# Patient Record
(demographics unavailable — no encounter records)

---

## 2024-10-16 NOTE — HISTORY OF PRESENT ILLNESS
[Mother] : mother [No] : No cigarette smoke exposure [NO] : No [de-identified] : 6 m[address] [facility] [FreeTextEntry1] : POPPY  is here for 6  month  well child visit Safety: Water heater temperature set at <120 degrees F. Carbon monoxide detectors at home. Smoke detectors at home. No gun in home. Nutrition taking vitamin d ebm formula solids purees once a day  Elimination: Normal urination and bowel movements Sleep: No concerns Immunizations: Up to date. Environmental   safety discussed Patient is doing well at home. Parent(s) have current concerns or issues. doing well rolling both ways sitting doing well diaper rash evaluated by Dr. Horta cardiologist PFO resolved and mild PPS history, no routine cardiac follow up family history maternal uncle VSD

## 2024-10-16 NOTE — DEVELOPMENTAL MILESTONES
[Passed] : passed [FreeTextEntry1] : [place]:  Gross Motor  6-3     Fine Motor5-2     Psychosocial  5-3      Language 6-2

## 2024-10-16 NOTE — PHYSICAL EXAM
[TextEntry] : General Appearance:  Awake,  Alert  In no acute distress well appearing Head:  Appearance:  Anterior fontanelle open and flat Neck:  supple. Eyes:   Pupils:  PERRL Ears: bilateral  Tympanic Membrane:  Pearly with light reflex bilaterally. Pharynx:Normal findings  non erythematous pharynx Lungs:  Clear to auscultation. Cardiovascular: Heart Rate And Rhythm:  Regular. Heart Sounds:  Normal. Murmurs:  No murmurs were heard. Abdomen: Palpation:  Soft.  Liver:  Not enlarged. Spleen:  Not enlarged.  Genitalia: Normal female external genitalia Vern 1.  Musculoskeletal System: General/bilateral:  Normal movement of all extremities.   Normal spine and back  Normal spine and back. Hips: General/bilateral:  An Ortolani test of the hips was negative.   Medina's test of the hips was negative Neurological:Motor:  Normal muscle tone. diaper rash 2 areas more elevated irritated

## 2024-10-16 NOTE — HISTORY OF PRESENT ILLNESS
[Mother] : mother [No] : No cigarette smoke exposure [NO] : No [de-identified] : 6 mth [facility] [FreeTextEntry1] : POPPY  is here for 6  month  well child visit Safety: Water heater temperature set at <120 degrees F. Carbon monoxide detectors at home. Smoke detectors at home. No gun in home. Nutrition taking vitamin d ebm formula solids purees once a day  Elimination: Normal urination and bowel movements Sleep: No concerns Immunizations: Up to date. Environmental   safety discussed Patient is doing well at home. Parent(s) have current concerns or issues. doing well rolling both ways sitting doing well diaper rash evaluated by Dr. Horta cardiologist PFO resolved and mild PPS history, no routine cardiac follow up family history maternal uncle VSD

## 2024-10-16 NOTE — HISTORY OF PRESENT ILLNESS
[Mother] : mother [No] : No cigarette smoke exposure [NO] : No [de-identified] : 6 mth [facility] [FreeTextEntry1] : POPPY  is here for 6  month  well child visit Safety: Water heater temperature set at <120 degrees F. Carbon monoxide detectors at home. Smoke detectors at home. No gun in home. Nutrition taking vitamin d ebm formula solids purees once a day  Elimination: Normal urination and bowel movements Sleep: No concerns Immunizations: Up to date. Environmental   safety discussed Patient is doing well at home. Parent(s) have current concerns or issues. doing well rolling both ways sitting doing well diaper rash evaluated by Dr. Horta cardiologist PFO resolved and mild PPS history, no routine cardiac follow up family history maternal uncle VSD

## 2024-10-16 NOTE — PLAN
[TextEntry] : flu and beyfortus discussed solids discussed mupirocin ointment tid stop vitamin d given mvf   The following 6 month anticipatory guidance topics were discussed and/or handouts given:  nutrition and feeding, infant development, oral health and safety. Counseling for nutrition was provided.  Information discussed with parent/guardian.    The components of the vaccine(s) to be administered today are listed in the plan of care. The disease(s) for which the vaccine(s) are intended to prevent and the risks have been discussed with the caretaker. The risks are also included in the appropriate vaccination information statements which have been provided to the patient's caregiver. The caregiver has given consent to vaccinate.

## 2025-01-22 NOTE — HISTORY OF PRESENT ILLNESS
[Mother] : mother [FreeTextEntry7] : 9 mth [facility] [FreeTextEntry1] : POPPY  is here for 9  month  well child visit Safety: Water heater temperature set at <120 degrees F. Carbon monoxide detectors at home. Smoke detectors at home. No gun in home. Nutrition formula about 28 oz 2 meals table food cruising pull to stand cawl bablin Elimination: Normal urination and bowel movements Sleep: No concerns Immunizations: Up to date. Environmental   safety discussed Patient is doing well at home. Parent(s) have current concerns or issues. doing well cruising pull to stand crawl babbling needs second influenza vaccine evaluated by Dr. Horta cardiologist PFO resolved and mild PPS history, no routine cardiac follow up family history maternal uncle VSD

## 2025-01-22 NOTE — PLAN
[TextEntry] : Counseling for all components of the vaccines given today (see orders below) discussed with patient and patients parent/legal guardian.  All questions answered good weight gain   The following 6 month anticipatory guidance topics were discussed and/or handouts given:  nutrition and feeding, infant development, oral health and safety. Counseling for nutrition was provided.  Information discussed with parent/guardian.    The components of the vaccine(s) to be administered today are listed in the plan of care. The disease(s) for which the vaccine(s) are intended to prevent and the risks have been discussed with the caretaker. The risks are also included in the appropriate vaccination information statements which have been provided to the patient's caregiver. The caregiver has given consent to vaccinate.

## 2025-01-22 NOTE — HISTORY OF PRESENT ILLNESS
[Mother] : mother [FreeTextEntry7] : 9 mth [facility] [FreeTextEntry1] : POPPY  is here for 9  month  well child visit Safety: Water heater temperature set at <120 degrees F. Carbon monoxide detectors at home. Smoke detectors at home. No gun in home. Nutrition formula about 28 oz 2 meals table food cruising pull to stand cawl bablin Elimination: Normal urination and bowel movements Sleep: No concerns Immunizations: Up to date. Environmental   safety discussed Patient is doing well at home. Parent(s) have current concerns or issues. doing well cruising pull to stand crawl babbling needs second influenza vaccine evaluated by Dr. Horta cardiologist PFO resolved and mild PPS history, no routine cardiac follow up family history maternal uncle VSD

## 2025-01-22 NOTE — HISTORY OF PRESENT ILLNESS
[Mother] : mother [FreeTextEntry7] : 9 mth [facility] [FreeTextEntry1] : POPPY  is here for 9  month  well child visit Safety: Water heater temperature set at <120 degrees F. Carbon monoxide detectors at home. Smoke detectors at home. No gun in home. Nutrition formula about 28 oz 2 meals table food cruising pull to stand cawl bablin Elimination: Normal urination and bowel movements Sleep: No concerns Immunizations: Up to date. Environmental   safety discussed Patient is doing well at home. Parent(s) have current concerns or issues. doing well cruising pull to stand crawl babbling needs second influenza vaccine evaluated by Dr. Horta cardiologist PFO resolved and mild PPS history, no routine cardiac follow up family history maternal uncle VSD

## 2025-01-22 NOTE — PHYSICAL EXAM
[TextEntry] :  General Appearance:  Awake,  Alert  In no acute distress good eye contact unoopoerative re hieght check x2 Head:  Appearance:  Anterior fontanelle open and flat Neck:  supple. Eyes:   Pupils:  PERRL Ears: bilateral  Tympanic Membrane:  Pearly with light reflex bilaterally. Pharynx:Normal findings  non erythematous pharynx Lungs:  Clear to auscultation. Cardiovascular: Heart Rate And Rhythm:  Regular. Heart Sounds:  Normal. Murmurs:  No murmurs were heard. Abdomen: Palpation:  Soft.  Liver:  Not enlarged. Spleen:  Not enlarged.  Genitalia: Normal female external genitalia Vern 1.  Musculoskeletal System: General/bilateral:  Normal movement of all extremities.   Normal spine and back  Normal spine and back. Hips: General/bilateral:  An Ortolani test of the hips was negative.   Medina's test of the hips was negative Neurological:Motor:  Normal muscle tone.

## 2025-01-22 NOTE — PHYSICAL EXAM
Body Location Override (Optional - Billing Will Still Be Based On Selected Body Map Location If Applicable): left medial lower eyelid (Left superior medial malar cheek) [TextEntry] :  General Appearance:  Awake,  Alert  In no acute distress good eye contact unoopoerative re hieght check x2 Head:  Appearance:  Anterior fontanelle open and flat Neck:  supple. Eyes:   Pupils:  PERRL Ears: bilateral  Tympanic Membrane:  Pearly with light reflex bilaterally. Pharynx:Normal findings  non erythematous pharynx Lungs:  Clear to auscultation. Cardiovascular: Heart Rate And Rhythm:  Regular. Heart Sounds:  Normal. Murmurs:  No murmurs were heard. Abdomen: Palpation:  Soft.  Liver:  Not enlarged. Spleen:  Not enlarged.  Genitalia: Normal female external genitalia Vern 1.  Musculoskeletal System: General/bilateral:  Normal movement of all extremities.   Normal spine and back  Normal spine and back. Hips: General/bilateral:  An Ortolani test of the hips was negative.   Medina's test of the hips was negative Neurological:Motor:  Normal muscle tone.

## 2025-02-26 NOTE — REVIEW OF SYSTEMS
[Ear Tugging] : no ear tugging [Nasal Congestion] : nasal congestion [Wheezing] : no wheezing [Cough] : cough [Rash] : rash [Negative] : Musculoskeletal

## 2025-02-26 NOTE — DISCUSSION/SUMMARY
[FreeTextEntry1] : Aquaphor, Desitin if increased redness  Recommend supportive care including humidifier, fluids, and nasal saline followed by nasal suction. Return if symptoms worsen or persist.

## 2025-02-26 NOTE — HISTORY OF PRESENT ILLNESS
[de-identified] : diaper rash X 1.5 weeks mom applied desitin, vaseline and aquaphor with ni improvements, also has some nasal congestion few days, afebrile, alert and active, no N/V/D/C, eating well, having wet diapers, not irritable  [FreeTextEntry6] : Persistent diaper rash x 10 days.  Bothers her at times, gets very red then improves with Aquaphor.  No diarrhea or fevers.  Also has some mild cold sxs.

## 2025-03-20 NOTE — HISTORY OF PRESENT ILLNESS
[de-identified] : Pulling left ear x 1 day as per dad. No other c/o.  [FreeTextEntry6] : Cold symptoms for about a week, slowly resolving. Now reaching behind left ear since yesterday. She is afebrile.

## 2025-03-20 NOTE — HISTORY OF PRESENT ILLNESS
[de-identified] : Pulling left ear x 1 day as per dad. No other c/o.  [FreeTextEntry6] : Cold symptoms for about a week, slowly resolving. Now reaching behind left ear since yesterday. She is afebrile.

## 2025-04-15 NOTE — BEGINNING OF VISIT
I discussed the patient with the resident.  I agree with history, physical, assessment and plan as documented.    [Parents] : parents

## 2025-04-16 NOTE — PLAN
[TextEntry] : .  The following 12 month anticipatory guidance topics were discussed and/or handouts given: establishing routines, feeding and appetite changes, oral hygiene and safety. Counseling for nutrition was provided.   Information discussed with parent/guardian.    The components of the vaccine(s) to be administered today are listed in the plan of care. The disease(s) for which the vaccine(s) are intended to prevent and the risks have been discussed with the caretaker. The risks are also included in the appropriate vaccination information statements which have been provided to the patient's caregiver. The caregiver has given consent to vaccinate.

## 2025-04-16 NOTE — HISTORY OF PRESENT ILLNESS
[Parents] : parents [FreeTextEntry7] : 12 mth [facility] [FreeTextEntry1] : POPPY  is here for12  month  well child visit Safety: Water heater temperature set at <120 degrees F. Carbon monoxide detectors at home. Smoke detectors at home. No gun in home. Nutrition formula  inc whole milk, wean re formula less than 24 oz, good eater Elimination: Normal urination and bowel movements Sleep: No concerns Immunizations: Up to date. Environmental   safety discussed Patient is doing well at home. Parent(s) have current concerns or issues. doing well, starting to walk few steps, babbling starting to point, taking swimming lessons recent uri improved introduced 2 oz milk, will increase gradually tolerated well uses sippy cup von transition to bottle evaluated by Dr. Horta cardiologist PFO resolved and mild PPS history, no routine cardiac follow up family history maternal uncle VSD

## 2025-04-16 NOTE — DEVELOPMENTAL MILESTONES
[FreeTextEntry1] : [place]:  Gross Motor  13-3     Fine Motor   13-3  Psychosocial   12-3     Language 13-1

## 2025-04-16 NOTE — HISTORY OF PRESENT ILLNESS
[Parents] : parents [FreeTextEntry7] : 12 mth [facility] [FreeTextEntry1] : POPPY  is here for12  month  well child visit Safety: Water heater temperature set at <120 degrees F. Carbon monoxide detectors at home. Smoke detectors at home. No gun in home. Nutrition formula  inc whole milk, wean re formula less than 24 oz, good eater Elimination: Normal urination and bowel movements Sleep: No concerns Immunizations: Up to date. Environmental   safety discussed Patient is doing well at home. Parent(s) have current concerns or issues. doing well, starting to walk few steps, babbling starting to point, taking swimming lessons recent uri improved introduced 2 oz milk, will increase gradually tolerated well uses sippy cup von transition to bottle evaluated by Dr. Horta cardiologist PFO resolved and mild PPS history, no routine cardiac follow up family history maternal uncle VSD

## 2025-04-16 NOTE — PHYSICAL EXAM
[TextEntry] : General Appearance:  Awake,  Alert  In no acute distress good eye contact well appearing afof small normal for age Neck:  supple. Eyes:   Pupils:  PERRL Ears: bilateral  Tympanic Membrane:  Pearly with light reflex bilaterally. Pharynx:Normal findings  non erythematous pharynx Lungs:  Clear to auscultation. Cardiovascular: Heart Rate And Rhythm:  Regular. Heart Sounds:  Normal. Murmurs:  No murmurs were heard. Abdomen: Palpation:  Soft.  Liver:  Not enlarged. Spleen:  Not enlarged.  Genitalia: Vern 1  normal female external genitalia   Musculoskeletal System: General/bilateral:  Normal movement of all extremities.   Normal spine and back. Hips: General/bilateral:  An Ortolani test of the hips was negative.   Medina's test of the hips was negative Neurological:Motor:  Normal muscle tone.

## 2025-04-16 NOTE — HISTORY OF PRESENT ILLNESS
[Parents] : parents [FreeTextEntry7] : 12 mth [facility] [FreeTextEntry1] : POPPY  is here for12  month  well child visit Safety: Water heater temperature set at <120 degrees F. Carbon monoxide detectors at home. Smoke detectors at home. No gun in home. Nutrition formula  inc whole milk, wean re formula less than 24 oz, good eater Elimination: Normal urination and bowel movements Sleep: No concerns Immunizations: Up to date. Environmental   safety discussed Patient is doing well at home. Parent(s) have current concerns or issues. doing well, starting to walk few steps, babbling starting to point, taking swimming lessons recent uri improved introduced 2 oz milk, will increase gradually tolerated well uses sippy cup von transition to bottle evaluated by Dr. Horta cardiologist PFO resolved and mild PPS history, no routine cardiac follow up family history maternal uncle VSD

## 2025-07-16 NOTE — PHYSICAL EXAM
[TextEntry] : General Appearance:  Awake,  Alert  In no acute distress well appearing Neck:  supple. Eyes:   Pupils:  PERRL Ears: bilateral  Tympanic Membrane:  Pearly with light reflex bilaterally. Pharynx:Normal findings  non erythematous pharynx Lungs:  Clear to auscultation. Cardiovascular: Heart Rate And Rhythm:  Regular. Heart Sounds:  Normal. Murmurs:  No murmurs were heard. Abdomen: Palpation:  Soft.  Liver:  Not enlarged. Spleen:  Not enlarged.  Genitalia: Vern 1  normal female external genitalia   Musculoskeletal System: General/bilateral:  Normal movement of all extremities.   Normal spine and back. Hips: General/bilateral:  An Ortolani test of the hips was negative.   Medina's test of the hips was negative Neurological:Motor:  Normal muscle tone.

## 2025-07-16 NOTE — DEVELOPMENTAL MILESTONES
[FreeTextEntry1] : [place]:  Gross Motor   14-3    Fine Motor  20-2   Psychosocial      19-3  [id number]

## 2025-07-16 NOTE — HISTORY OF PRESENT ILLNESS
[Parents] : parents [FreeTextEntry7] : 15 mo [facility] [FreeTextEntry1] : POPPY  is here for 15  month  well child visit Safety: Water heater temperature set at <120 degrees F. Carbon monoxide detectors at home. Smoke detectors at home. No gun in home. Nutrition good veggies fruits Elimination: Normal urination and bowel movements mushy past few weeks not uncomfortable  Sleep: No concerns Immunizations: Up to date. Environmental   safety discussed Patient is doing well at home. Parent(s) have current concerns or issues. doing well, evaluated by Dr. Horta cardiologist PFO resolved and mild PPS history, no routine cardiac follow up family history maternal uncle VSD  stools mushy past few weeks stopped dairy one week  no change increase fruits with summer, no juice,  eats veggies/fruits observe no family history celiac  teething says about 5 to 6 words understands points doing well

## 2025-07-16 NOTE — HISTORY OF PRESENT ILLNESS
[Parents] : parents [FreeTextEntry7] : 15 mo [facility] [FreeTextEntry1] : POPPY  is here for 15  month  well child visit Safety: Water heater temperature set at <120 degrees F. Carbon monoxide detectors at home. Smoke detectors at home. No gun in home. Nutrition good veggies fruits Elimination: Normal urination and bowel movements mushy past few weeks not uncomfortable  Sleep: No concerns Immunizations: Up to date. Environmental   safety discussed Patient is doing well at home. Parent(s) have current concerns or issues. doing well, evaluated by Dr. Horta cardiologist PFO resolved and mild PPS history, no routine cardiac follow up family history maternal uncle VSD  stools mushy past few weeks stopped dairy one week  no change increase fruits with summer, no juice,  eats veggies/fruits observe no family history celiac  teething says about 5 to 6 words understands points doing well

## 2025-07-16 NOTE — HISTORY OF PRESENT ILLNESS
[Parents] : parents [FreeTextEntry7] : 15 mo [facility] [FreeTextEntry1] : POPPY  is here for 15  month  well child visit Safety: Water heater temperature set at <120 degrees F. Carbon monoxide detectors at home. Smoke detectors at home. No gun in home. Nutrition good veggies fruits Elimination: Normal urination and bowel movements mushy past few weeks not uncomfortable  Sleep: No concerns Immunizations: Up to date. Environmental   safety discussed Patient is doing well at home. Parent(s) have current concerns or issues. doing well, evaluated by Dr. Horta cardiologist PFO resolved and mild PPS history, no routine cardiac follow up family history maternal uncle VSD  stools mushy past few weeks stopped dairy one week  no change increase fruits with summer, no juice,  eats veggies/fruits observe no family history celiac  teething says about 5 to 6 words understands points doing well Report given to Jacklyn GÓMEZ at [facility]

## 2025-07-16 NOTE — PLAN
[TextEntry] : bowel movement change observe good weight gain 76%  increased fruits with summer and teething if changes , concerns consider GI   The following 15 month anticipatory guidance topics were discussed and/or handouts given: communication and social development, sleep routines and issues, temper tantrums and discipline, healthy teeth and safety. Counseling for nutrition was provided   Information discussed with parent/guardian.    The components of the vaccine(s) to be administered today are listed in the plan of care. The disease(s) for which the vaccine(s) are intended to prevent and the risks have been discussed with the caretaker. The risks are also included in the appropriate vaccination information statements which have been provided to the patient's caregiver. The caregiver has given consent to vaccinate. 
1

## 2025-07-16 NOTE — DEVELOPMENTAL MILESTONES
[FreeTextEntry1] : [place]:  Gross Motor   14-3    Fine Motor  20-2   Psychosocial      19-3  [id number]